# Patient Record
Sex: FEMALE | Race: WHITE | NOT HISPANIC OR LATINO | Employment: FULL TIME | URBAN - METROPOLITAN AREA
[De-identification: names, ages, dates, MRNs, and addresses within clinical notes are randomized per-mention and may not be internally consistent; named-entity substitution may affect disease eponyms.]

---

## 2020-06-16 ENCOUNTER — HISTORICAL (OUTPATIENT)
Dept: RADIOLOGY | Facility: HOSPITAL | Age: 45
End: 2020-06-16

## 2021-09-22 ENCOUNTER — HISTORICAL (OUTPATIENT)
Dept: RADIOLOGY | Facility: HOSPITAL | Age: 46
End: 2021-09-22

## 2022-10-25 DIAGNOSIS — Z12.31 ENCOUNTER FOR SCREENING MAMMOGRAM FOR MALIGNANT NEOPLASM OF BREAST: Primary | ICD-10-CM

## 2022-11-02 ENCOUNTER — HOSPITAL ENCOUNTER (OUTPATIENT)
Dept: RADIOLOGY | Facility: HOSPITAL | Age: 47
Discharge: HOME | End: 2022-11-02
Attending: OBSTETRICS & GYNECOLOGY
Payer: COMMERCIAL

## 2022-11-02 DIAGNOSIS — Z12.31 ENCOUNTER FOR SCREENING MAMMOGRAM FOR MALIGNANT NEOPLASM OF BREAST: ICD-10-CM

## 2022-11-02 PROCEDURE — 77067 SCR MAMMO BI INCL CAD: CPT | Mod: TC

## 2022-11-02 PROCEDURE — 77063 MAMMO DIGITAL SCREENING BILAT WITH TOMO: ICD-10-PCS | Mod: 26,,, | Performed by: RADIOLOGY

## 2022-11-02 PROCEDURE — 77067 SCR MAMMO BI INCL CAD: CPT | Mod: 26,,, | Performed by: RADIOLOGY

## 2022-11-02 PROCEDURE — 77067 MAMMO DIGITAL SCREENING BILAT WITH TOMO: ICD-10-PCS | Mod: 26,,, | Performed by: RADIOLOGY

## 2022-11-02 PROCEDURE — 77063 BREAST TOMOSYNTHESIS BI: CPT | Mod: 26,,, | Performed by: RADIOLOGY

## 2023-04-15 ENCOUNTER — HOSPITAL ENCOUNTER (EMERGENCY)
Facility: HOSPITAL | Age: 48
Discharge: HOME | End: 2023-04-15
Attending: EMERGENCY MEDICINE
Payer: COMMERCIAL

## 2023-04-15 VITALS
WEIGHT: 180 LBS | RESPIRATION RATE: 15 BRPM | HEIGHT: 67 IN | TEMPERATURE: 98 F | HEART RATE: 73 BPM | BODY MASS INDEX: 28.25 KG/M2 | DIASTOLIC BLOOD PRESSURE: 78 MMHG | OXYGEN SATURATION: 98 % | SYSTOLIC BLOOD PRESSURE: 116 MMHG

## 2023-04-15 DIAGNOSIS — H53.9 VISION CHANGES: ICD-10-CM

## 2023-04-15 DIAGNOSIS — R20.2 PARESTHESIAS: Primary | ICD-10-CM

## 2023-04-15 DIAGNOSIS — R07.9 CHEST PAIN: ICD-10-CM

## 2023-04-15 LAB
ALBUMIN SERPL-MCNC: 4.4 G/DL (ref 3.5–5)
ALBUMIN/GLOB SERPL: 1.5 RATIO (ref 1.1–2)
ALP SERPL-CCNC: 47 UNIT/L (ref 40–150)
ALT SERPL-CCNC: 9 UNIT/L (ref 0–55)
AST SERPL-CCNC: 12 UNIT/L (ref 5–34)
BASOPHILS # BLD AUTO: 0.03 X10(3)/MCL (ref 0–0.2)
BASOPHILS NFR BLD AUTO: 0.4 %
BILIRUBIN DIRECT+TOT PNL SERPL-MCNC: 0.6 MG/DL
BNP BLD-MCNC: 30.7 PG/ML
BUN SERPL-MCNC: 13 MG/DL (ref 7–18.7)
CALCIUM SERPL-MCNC: 9.9 MG/DL (ref 8.4–10.2)
CHLORIDE SERPL-SCNC: 106 MMOL/L (ref 98–107)
CO2 SERPL-SCNC: 28 MMOL/L (ref 22–29)
CREAT SERPL-MCNC: 0.79 MG/DL (ref 0.55–1.02)
EOSINOPHIL # BLD AUTO: 0.01 X10(3)/MCL (ref 0–0.9)
EOSINOPHIL NFR BLD AUTO: 0.1 %
ERYTHROCYTE [DISTWIDTH] IN BLOOD BY AUTOMATED COUNT: 11.9 % (ref 11.5–17)
GFR SERPLBLD CREATININE-BSD FMLA CKD-EPI: >60 MLS/MIN/1.73/M2
GLOBULIN SER-MCNC: 3 GM/DL (ref 2.4–3.5)
GLUCOSE SERPL-MCNC: 90 MG/DL (ref 74–100)
HCT VFR BLD AUTO: 40.9 % (ref 37–47)
HGB BLD-MCNC: 14.1 G/DL (ref 12–16)
IMM GRANULOCYTES # BLD AUTO: 0.02 X10(3)/MCL (ref 0–0.04)
IMM GRANULOCYTES NFR BLD AUTO: 0.3 %
INR BLD: 1.05 (ref 0–1.3)
LYMPHOCYTES # BLD AUTO: 1.53 X10(3)/MCL (ref 0.6–4.6)
LYMPHOCYTES NFR BLD AUTO: 22.7 %
MAGNESIUM SERPL-MCNC: 1.9 MG/DL (ref 1.6–2.6)
MCH RBC QN AUTO: 31.9 PG (ref 27–31)
MCHC RBC AUTO-ENTMCNC: 34.5 G/DL (ref 33–36)
MCV RBC AUTO: 92.5 FL (ref 80–94)
MONOCYTES # BLD AUTO: 0.7 X10(3)/MCL (ref 0.1–1.3)
MONOCYTES NFR BLD AUTO: 10.4 %
NEUTROPHILS # BLD AUTO: 4.46 X10(3)/MCL (ref 2.1–9.2)
NEUTROPHILS NFR BLD AUTO: 66.1 %
NRBC BLD AUTO-RTO: 0 %
PLATELET # BLD AUTO: 212 X10(3)/MCL (ref 130–400)
PMV BLD AUTO: 10.6 FL (ref 7.4–10.4)
POTASSIUM SERPL-SCNC: 3.8 MMOL/L (ref 3.5–5.1)
PROT SERPL-MCNC: 7.4 GM/DL (ref 6.4–8.3)
PROTHROMBIN TIME: 13.6 SECONDS (ref 12.5–14.5)
RBC # BLD AUTO: 4.42 X10(6)/MCL (ref 4.2–5.4)
SODIUM SERPL-SCNC: 141 MMOL/L (ref 136–145)
TROPONIN I SERPL-MCNC: <0.01 NG/ML (ref 0–0.04)
WBC # SPEC AUTO: 6.8 X10(3)/MCL (ref 4.5–11.5)

## 2023-04-15 PROCEDURE — 99285 EMERGENCY DEPT VISIT HI MDM: CPT | Mod: 25

## 2023-04-15 PROCEDURE — 85025 COMPLETE CBC W/AUTO DIFF WBC: CPT | Performed by: EMERGENCY MEDICINE

## 2023-04-15 PROCEDURE — 93005 ELECTROCARDIOGRAM TRACING: CPT

## 2023-04-15 PROCEDURE — 83880 ASSAY OF NATRIURETIC PEPTIDE: CPT | Performed by: EMERGENCY MEDICINE

## 2023-04-15 PROCEDURE — 93010 EKG 12-LEAD: ICD-10-PCS | Mod: ,,, | Performed by: INTERNAL MEDICINE

## 2023-04-15 PROCEDURE — 80053 COMPREHEN METABOLIC PANEL: CPT | Performed by: EMERGENCY MEDICINE

## 2023-04-15 PROCEDURE — 84484 ASSAY OF TROPONIN QUANT: CPT | Performed by: EMERGENCY MEDICINE

## 2023-04-15 PROCEDURE — 83735 ASSAY OF MAGNESIUM: CPT | Performed by: EMERGENCY MEDICINE

## 2023-04-15 PROCEDURE — 85610 PROTHROMBIN TIME: CPT | Performed by: EMERGENCY MEDICINE

## 2023-04-15 PROCEDURE — 93010 ELECTROCARDIOGRAM REPORT: CPT | Mod: ,,, | Performed by: INTERNAL MEDICINE

## 2023-04-15 NOTE — ED PROVIDER NOTES
Encounter Date: 4/15/2023    SCRIBE #1 NOTE: I, Fritz Durham, am scribing for, and in the presence of,  Douglas Markham MD. I have scribed the following portions of the note - Other sections scribed: HPI, ROS, PE.     History     Chief Complaint   Patient presents with    Numbness     Presents to er c/o intermittent numbness to body since Monday.   States today having chest pain, with left arm pain and blurry vision.  Pt is aaox4 ambulated into room with steady gait.  States seen at PCP this week for same symptoms.       48 y/o female presents to the ED with body numbness. Pt says she began feelingnumbness and pressure in her left arm on Monday. The numbness migrated to both of her feet. She saw her PCP on Tuesday, and blood work and an EKG were done that showed no abnormalities. The numbness began to migrate to different parts of her body, including her thighs, calves, feet, and hands. 2 days ago migrated to left shoulder blade. Yesterday she began experiencing intermittent blurry vision in her left eye, currently improved. Today, the numbness has migrated to her chest Denies any recent injuries and accidents. PCP Dr. Naomy Max.    The history is provided by the patient. No  was used.   Illness   The current episode started several days ago. The problem has been unchanged. Pertinent negatives include no fever, no abdominal pain, no nausea, no vomiting, no neck pain, no cough and no shortness of breath.   Review of patient's allergies indicates:  No Known Allergies  No past medical history on file.  No past surgical history on file.  No family history on file.     Review of Systems   Constitutional:  Negative for chills and fever.   Eyes:  Positive for visual disturbance.   Respiratory:  Negative for cough, chest tightness and shortness of breath.    Cardiovascular:  Negative for chest pain.   Gastrointestinal:  Negative for abdominal pain, nausea and vomiting.   Musculoskeletal:   Negative for myalgias and neck pain.   Neurological:  Positive for numbness. Negative for syncope.   All other systems reviewed and are negative.    Physical Exam     Initial Vitals [04/15/23 0813]   BP Pulse Resp Temp SpO2   (!) 152/73 92 16 97.9 °F (36.6 °C) 99 %      MAP       --         Physical Exam    Nursing note and vitals reviewed.  Constitutional: She appears well-developed and well-nourished. No distress.   HENT:   Head: Normocephalic and atraumatic.   Eyes: Conjunctivae are normal.   Visual fields intact   Cardiovascular:  Normal rate and intact distal pulses.           Pulmonary/Chest: No respiratory distress. She has no rhonchi.   Abdominal: Abdomen is soft. Bowel sounds are normal. There is no abdominal tenderness. There is no rebound and no guarding.   Musculoskeletal:         General: No edema.      Right upper arm: Normal.      Left upper arm: Normal.      Right forearm: Normal.      Left forearm: Normal.      Right upper leg: Normal.      Left upper leg: Normal.      Right lower leg: Normal.      Left lower leg: Normal.     Neurological: She is alert. She has normal strength.   Cranial nerves II-X intact    Nl strength deltoids, 5/5 f/e elbows, wrists, nl hand . Nl light touch sensation in radial, median and ulner distribution      Nl hip flexion 5/5 knee, ankle, great toes. Nl light touch sensation to distal LEs     Skin: Skin is warm and dry.   Psychiatric: She has a normal mood and affect.       ED Course   Procedures  Labs Reviewed   CBC WITH DIFFERENTIAL - Abnormal; Notable for the following components:       Result Value    MCH 31.9 (*)     MPV 10.6 (*)     All other components within normal limits   B-TYPE NATRIURETIC PEPTIDE - Normal   TROPONIN I - Normal   PROTIME-INR - Normal   MAGNESIUM - Normal   CBC W/ AUTO DIFFERENTIAL    Narrative:     The following orders were created for panel order CBC auto differential.  Procedure                               Abnormality         Status                      ---------                               -----------         ------                     CBC with Differential[[id number]]        Abnormal            Final result                 Please view results for these tests on the individual orders.   COMPREHENSIVE METABOLIC PANEL        ECG Results              EKG 12-lead (Preliminary result)  Result time 04/15/23 09:10:27      Wet Read by Douglas Markham MD (04/15/23 09:10:27, [facility] - Emergency Dept, Emergency Medicine)    8:18 a.m..  88 beats per minute.  Normal sinus rhythm.  No ST segment elevations or depressions.  Normal axis                                  Imaging Results              CT Head Without Contrast (Final result)  Result time 04/15/23 09:58:26      Final result by Jose Pedraza MD (04/15/23 09:58:26)                   Impression:      No acute abnormality.      Electronically signed by: Jose Pedraza MD  Date:    04/15/2023  Time:    09:58               Narrative:    EXAMINATION:  CT HEAD WITHOUT CONTRAST    CLINICAL HISTORY:  paresthesias;    TECHNIQUE:  Low dose axial CT images obtained throughout the head without intravenous contrast. Sagittal and coronal reconstructions were performed.  An automated dose exposure technique was utilized this limits radiation does the patient.    COMPARISON:  None.    FINDINGS:  Intracranial compartment:    Ventricles and sulci are normal in size for age without evidence of hydrocephalus. No extra-axial blood or fluid collections.    The brain parenchyma appears normal. No parenchymal mass, hemorrhage, edema or major vascular distribution infarct.    Skull/extracranial contents (limited evaluation): No fracture. Mastoid air cells and paranasal sinuses are essentially clear.                                       X-Ray Chest PA And Lateral (Final result)  Result time 04/15/23 09:04:14      Final result by Rayshawn Sherman MD (04/15/23 09:04:14)                   Impression:      No acute  cardiopulmonary process identified.      Electronically signed by: Rayshawn Sherman  Date:    04/15/2023  Time:    09:04               Narrative:    EXAMINATION:  XR CHEST PA AND LATERAL    CLINICAL HISTORY:  Chest Pain;    TECHNIQUE:  Two-view    COMPARISON:  None available.    FINDINGS:  Cardiopericardial silhouette is within normal limits. Lungs are without dense focal or segmental consolidation, congestion, pleural effusion or pneumothorax.                                       Medications - No data to display  Medical Decision Making:   Clinical Tests:   Lab Tests: Ordered    Medical Decision Making  The differential diagnosis includes, but is not limited to:    Amount and/or Complexity of Data Reviewed  Labs: ordered.  Radiology: ordered.  ECG/medicine tests: ordered.             Scribe Attestation:   Scribe #1: I performed the above scribed service and the documentation accurately describes the services I performed. I attest to the accuracy of the note.    Attending Attestation:           Physician Attestation for Scribe:  Physician Attestation Statement for Scribe #1: I, Douglas Markham MD, reviewed documentation, as scribed by Fritz Durham in my presence, and it is both accurate and complete.           ED Course as of 04/15/23 1024   Sat Apr 15, 2023   0933 Symptoms at 5 days ago.  On exam no focal neurologic deficits to suggest acute ischemic CVA.  Thus far electrolytes have been reassuring.  I have added a magnesium level which is pending.  I discussed differential with the patient and .  Currently her vision appears to be intact including confrontation all fields.  She is not having eye pain currently she states there may have been some mild pressure like feeling whenever it felt blurred.  Paresthesias initially in the left bicep region then became the whole left arm and bilateral feet and radiated up the right lower extremity from the feet toward the right thigh.  I explained that the migratory  nature of these paresthesias do not sound consistent with a CVA or spinal cord pathology but radicular neuropathy is a consideration.  On exam normal strength and sensation bilateral upper and lower extremities.  No indication for emergent MRI of the spine at this time.  Multiple sclerosis is also a consideration.  I would recommend she follow up with her PCP and neurologist as outpatient for further evaluation of this.  They expressed understanding [LF]      ED Course User Index  [LF] Douglas Markham MD                 Clinical Impression:   Final diagnoses:  [R07.9] Chest pain  [R20.2] Paresthesias (Primary)  [H53.9] Vision changes        ED Disposition Condition    Discharge Stable          ED Prescriptions    None       Follow-up Information       Follow up With Specialties Details Why Contact Info    Naomy Max MD Internal Medicine Schedule an appointment as soon as possible for a visit   [address]  [facility] [address]  [phone]      Jose Maria Fajardo MD Neurology Schedule an appointment as soon as possible for a visit   [address] [place] [address] Rd  [address]  [facility] [address]  [phone]               Douglas Markham MD  04/15/23 1024

## 2023-04-15 NOTE — DISCHARGE INSTRUCTIONS
Your lab work x-ray and CT scan are reassuring.  She continues to have episodes follow up with the primary care provider you may need additional testing such as an MRI.  You may need to see a neurologist for further evaluation

## 2023-04-25 DIAGNOSIS — R20.2 PARESTHESIA: Primary | ICD-10-CM

## 2023-06-13 ENCOUNTER — OFFICE VISIT (OUTPATIENT)
Dept: NEUROLOGY | Facility: CLINIC | Age: 48
End: 2023-06-13
Payer: COMMERCIAL

## 2023-06-13 VITALS
BODY MASS INDEX: 28.44 KG/M2 | SYSTOLIC BLOOD PRESSURE: 108 MMHG | HEART RATE: 78 BPM | DIASTOLIC BLOOD PRESSURE: 78 MMHG | WEIGHT: 181.19 LBS | HEIGHT: 67 IN

## 2023-06-13 DIAGNOSIS — H54.7 UNSPECIFIED VISUAL LOSS: Primary | ICD-10-CM

## 2023-06-13 DIAGNOSIS — R20.2 PARESTHESIA: ICD-10-CM

## 2023-06-13 PROCEDURE — 99204 OFFICE O/P NEW MOD 45 MIN: CPT | Mod: S$GLB,,, | Performed by: PSYCHIATRY & NEUROLOGY

## 2023-06-13 PROCEDURE — 3008F PR BODY MASS INDEX (BMI) DOCUMENTED: ICD-10-PCS | Mod: CPTII,S$GLB,, | Performed by: PSYCHIATRY & NEUROLOGY

## 2023-06-13 PROCEDURE — 99999 PR PBB SHADOW E&M-EST. PATIENT-LVL IV: CPT | Mod: PBBFAC,,, | Performed by: PSYCHIATRY & NEUROLOGY

## 2023-06-13 PROCEDURE — 1160F PR REVIEW ALL MEDS BY PRESCRIBER/CLIN PHARMACIST DOCUMENTED: ICD-10-PCS | Mod: CPTII,S$GLB,, | Performed by: PSYCHIATRY & NEUROLOGY

## 2023-06-13 PROCEDURE — 1159F MED LIST DOCD IN RCRD: CPT | Mod: CPTII,S$GLB,, | Performed by: PSYCHIATRY & NEUROLOGY

## 2023-06-13 PROCEDURE — 3078F PR MOST RECENT DIASTOLIC BLOOD PRESSURE < 80 MM HG: ICD-10-PCS | Mod: CPTII,S$GLB,, | Performed by: PSYCHIATRY & NEUROLOGY

## 2023-06-13 PROCEDURE — 3074F SYST BP LT 130 MM HG: CPT | Mod: CPTII,S$GLB,, | Performed by: PSYCHIATRY & NEUROLOGY

## 2023-06-13 PROCEDURE — 1159F PR MEDICATION LIST DOCUMENTED IN MEDICAL RECORD: ICD-10-PCS | Mod: CPTII,S$GLB,, | Performed by: PSYCHIATRY & NEUROLOGY

## 2023-06-13 PROCEDURE — 99999 PR PBB SHADOW E&M-EST. PATIENT-LVL IV: ICD-10-PCS | Mod: PBBFAC,,, | Performed by: PSYCHIATRY & NEUROLOGY

## 2023-06-13 PROCEDURE — 1160F RVW MEDS BY RX/DR IN RCRD: CPT | Mod: CPTII,S$GLB,, | Performed by: PSYCHIATRY & NEUROLOGY

## 2023-06-13 PROCEDURE — 3074F PR MOST RECENT SYSTOLIC BLOOD PRESSURE < 130 MM HG: ICD-10-PCS | Mod: CPTII,S$GLB,, | Performed by: PSYCHIATRY & NEUROLOGY

## 2023-06-13 PROCEDURE — 3078F DIAST BP <80 MM HG: CPT | Mod: CPTII,S$GLB,, | Performed by: PSYCHIATRY & NEUROLOGY

## 2023-06-13 PROCEDURE — 99204 PR OFFICE/OUTPT VISIT, NEW, LEVL IV, 45-59 MIN: ICD-10-PCS | Mod: S$GLB,,, | Performed by: PSYCHIATRY & NEUROLOGY

## 2023-06-13 PROCEDURE — 3008F BODY MASS INDEX DOCD: CPT | Mod: CPTII,S$GLB,, | Performed by: PSYCHIATRY & NEUROLOGY

## 2023-06-13 RX ORDER — GABAPENTIN 300 MG/1
CAPSULE ORAL
Qty: 90 CAPSULE | Refills: 11 | Status: SHIPPED | OUTPATIENT
Start: 2023-06-13 | End: 2023-07-24

## 2023-06-13 RX ORDER — ACETAMINOPHEN 500 MG
500 TABLET ORAL EVERY 6 HOURS PRN
COMMUNITY

## 2023-06-13 RX ORDER — MULTIVITAMIN
1 TABLET ORAL DAILY
COMMUNITY

## 2023-06-13 RX ORDER — POLYETHYLENE GLYCOL 3350 17 G/17G
34 POWDER, FOR SOLUTION ORAL
COMMUNITY

## 2023-06-13 NOTE — PROGRESS NOTES
Chief Complaint   Patient presents with    Numbness     NP: Referred by Dr. Naomy Max for Neuro consult to evaluate for Paresthesia: April 15 did go to [facility] ER for numbness and pain to left arms, both legs and later on numbness moved over to chest and  then started having blurred vision lasting for 48 hours. So far had a total of 3 episodes of numbness. A few days ago numbness started to right arm. Describes pain as dull.         This is a 47 y.o. female  here for numbness.  Referred by Dr. Naomy Max for Neuro consult to evaluate for Paresthesia:  Symptoms started several months ago in the left arm.  Patient noted numbness that starts in the shoulder and radiates down to the elbow.  She had been having numbness in her bilateral lower extremities, not necessarily the feet but more proximal for several months prior to that.  She attributed this to a herniated disc in her back, she underwent a microdiskectomy in October of 2022 with Dr. Smith.  Notably she is still having back pain that radiates into her legs, and can not walk long distances without getting back pain.  April 15 did go to [facility] ER because the numbness became painful and radiated into her chest.  Numbness radiates under her breasts and sometimes wraps around to her scapula.  f around the same time she was also having blurry vision in the left eye.  She saw her eye doctor and it was noted that in both eyes she needed new prescriptions in vision had become worse.  The pain associated with the numbness feels deep and at times like a shocking radiating down her arm.  Most recently she has noticed symptoms on the right arm as well.      Medication List with Changes/Refills   New Medications    GABAPENTIN (NEURONTIN) 300 MG CAPSULE    1 PO q pm for 3 days then increase to 1 PO BID for 3 days, can increase to 1 PO TID thereafter if needed   Current Medications    ACETAMINOPHEN (TYLENOL) 500 MG TABLET    Take 500 mg by mouth every 6 (six) hours as  needed.    MULTIVIT WITH MIN-FOLIC ACID 0.4 MG TAB    Take 1 tablet by mouth once daily.    POLYETHYLENE GLYCOL (GLYCOLAX) 17 GRAM PWPK    Take 34 g by mouth once daily.        Past Surgical History:   Procedure Laterality Date    CERVICAL DISCECTOMY          Past Medical History:   Diagnosis Date    Gastroparesis     Lumbar herniated disc         Family History   Problem Relation Age of Onset    Bipolar disorder Mother     Diabetes Father         Social History     Socioeconomic History    Marital status:    Tobacco Use    Smoking status: Never    Smokeless tobacco: Never   Substance and Sexual Activity    Alcohol use: Yes     Alcohol/week: 1.0 standard drink     Types: 1 Glasses of wine per week     Comment: three times weekly    Drug use: Never          Review of Systems  Review of Systems   Constitutional: Negative for appetite change.   HENT: Negative for sinus pressure and sore throat.    Eyes: Negative for visual disturbance.   Respiratory: Negative for cough and shortness of breath.    Cardiovascular: Negative for chest pain.   Gastrointestinal: Negative for diarrhea and nausea.   Endocrine: Negative for cold intolerance and heat intolerance.   Genitourinary: Negative for dysuria.   Musculoskeletal: Negative for arthralgias and myalgias.   Skin: Negative for rash.   Allergic/Immunologic: Negative for immunocompromised state.   Neurological:        See HPI   Hematological: Does not bruise/bleed easily.   Psychiatric/Behavioral: Negative for hallucinations.      General: alert and oriented, no acute distress, no audible wheezes, pulse intact, no edema    Vitals:    06/13/23 0809   BP: 108/78   Pulse: 78        Cognition and Comprehension  Speech and language intact  Follows commands  Speech fluent  Attention intact  Memory for recent events intact from history taking  Affect pleasant  Fund of knowledge adequate    Cranial nerves  II. Optic: Visual fields full to confrontation both eyes  III, IV, VI.  Oculomotor: Intact, Pupils equal, round and reactive to light, no nystagmus  V. Trigeminal: sensation to light touch normal  VII. No facial asymmetry or facial weakness  VIII. Hearing intact to spoken voice  IX/X. Glossopharyngeal/Vagus: Voice normal, palate rises symmetrically  XI. Axillary: Shoulder shrug normal  XII. Hypoglossal: Intact    Muscle Strength and Tone  Normal upper extremity tone  Normal lower extremity tone  Normal upper extremity strength  Normal lower extremity strength    Sensation  Intact to light touch and temperature    Reflexes  Normal and symmetric    Coordination and Gait  Finger to nose normal  Gait normal       Georgina was seen today for numbness.    Diagnoses and all orders for this visit:    Unspecified visual loss  -     MRI Brain W WO Contrast; Future  -     MRI Brain W WO Contrast    Paresthesia  -     Ambulatory referral/consult to Neurology  -     Sedimentation rate; Future  -     ANTI-SSA + ANTI-SSB; Future  -     TSH; Future  -     Vitamin B12; Future  -     Hemoglobin A1C; Future  -     Ferritin; Future  -     MAYI IgG by IFA; Future  -     Lyme Disease Ab, Immunoblot; Future  -     Pyridoxal 5-Phosphate (PLP); Future  -     MRI Thoracic Spine Demyelinating W W/O Contrast; Future  -     MRI Thoracic Spine Demyelinating W W/O Contrast  -     gabapentin (NEURONTIN) 300 MG capsule; 1 PO q pm for 3 days then increase to 1 PO BID for 3 days, can increase to 1 PO TID thereafter if needed       Neurologic exam is normal, we will obtain previous results from an MRI cervical spine that was done.  We will also order MRI brain and thoracic spine.  Can use gabapentin for nonspecific nerve pain.  Laboratory workup for neuropathy.  Discussed that we would call her with the results, and if everything is normal would consider doing an EMG.

## 2023-06-14 ENCOUNTER — LAB VISIT (OUTPATIENT)
Dept: LAB | Facility: HOSPITAL | Age: 48
End: 2023-06-14
Attending: PSYCHIATRY & NEUROLOGY
Payer: COMMERCIAL

## 2023-06-14 DIAGNOSIS — R20.2 PARESTHESIA: Primary | ICD-10-CM

## 2023-06-14 LAB
ERYTHROCYTE [SEDIMENTATION RATE] IN BLOOD: 7 MM/HR (ref 0–20)
EST. AVERAGE GLUCOSE BLD GHB EST-MCNC: 91.1 MG/DL
FERRITIN SERPL-MCNC: 36.54 NG/ML (ref 4.63–204)
HBA1C MFR BLD: 4.8 %
TSH SERPL-ACNC: 1.2 UIU/ML (ref 0.35–4.94)
VIT B12 SERPL-MCNC: 471 PG/ML (ref 213–816)

## 2023-06-14 PROCEDURE — 36415 COLL VENOUS BLD VENIPUNCTURE: CPT

## 2023-06-14 PROCEDURE — 86617 LYME DISEASE ANTIBODY: CPT

## 2023-06-14 PROCEDURE — 86235 NUCLEAR ANTIGEN ANTIBODY: CPT

## 2023-06-14 PROCEDURE — 86039 ANTINUCLEAR ANTIBODIES (ANA): CPT

## 2023-06-14 PROCEDURE — 82728 ASSAY OF FERRITIN: CPT

## 2023-06-14 PROCEDURE — 83036 HEMOGLOBIN GLYCOSYLATED A1C: CPT

## 2023-06-14 PROCEDURE — 85652 RBC SED RATE AUTOMATED: CPT

## 2023-06-14 PROCEDURE — 82607 VITAMIN B-12: CPT

## 2023-06-14 PROCEDURE — 84443 ASSAY THYROID STIM HORMONE: CPT

## 2023-06-14 PROCEDURE — 84207 ASSAY OF VITAMIN B-6: CPT

## 2023-06-15 LAB
ANA SER QL HEP2 SUBST: NORMAL
SSA(RO) AB QUANT (OHS): <0.4 U/ML
SSB(LA) AB QUANT (OHS): <0.4 U/ML

## 2023-06-16 LAB
B BURGDOR AB PATRN SER IB-IMP: NORMAL
B BURGDOR IGG PATRN SER IB-IMP: NORMAL KDA
B BURGDOR IGG SER QL IB: NEGATIVE
B BURGDOR IGM PATRN SER IB-IMP: NORMAL KDA
B BURGDOR IGM SER QL IB: NEGATIVE

## 2023-06-17 LAB — PYRIDOXAL PHOS SERPL-MCNC: 22 MCG/L (ref 5–50)

## 2023-06-19 NOTE — PROGRESS NOTES
B12 is a little low, I would supplement with B12 2000 micrograms daily.  Otherwise labs are overall normal.

## 2023-06-20 ENCOUNTER — TELEPHONE (OUTPATIENT)
Dept: NEUROLOGY | Facility: CLINIC | Age: 48
End: 2023-06-20

## 2023-06-20 NOTE — TELEPHONE ENCOUNTER
----- Message from Elle Padilla MD sent at 6/19/2023  4:24 PM CDT -----  B12 is a little low, I would supplement with B12 2000 micrograms daily.  Otherwise labs are overall normal.

## 2023-06-20 NOTE — TELEPHONE ENCOUNTER
Pt informed B12 is a little low would recommend OTC B12 supplement of 2000 mcg daily otherwise labs were overall normal

## 2023-06-26 NOTE — PROGRESS NOTES
MRI brain and MRI thoracic spine are overall unremarkable with no explanation for symptoms she is experiencing. Has she tried the medication (gbp) and did it help at all? I can send a referral for EMG if sx are still present.

## 2023-06-27 ENCOUNTER — TELEPHONE (OUTPATIENT)
Dept: NEUROLOGY | Facility: CLINIC | Age: 48
End: 2023-06-27
Payer: COMMERCIAL

## 2023-06-27 DIAGNOSIS — R20.2 PARESTHESIA: Primary | ICD-10-CM

## 2023-06-27 NOTE — TELEPHONE ENCOUNTER
Pt informed MRI brain and MRI thoracic spine are overall unremarkable with no explanation for symptoms. Pt states she started gbp is slightly helpful regarding her arms and legs, but states chest pain and tightness has become pretty severe along with increase blurred vision that is now at least once a day can be up to twice a day and some difficulty with heat intolerance states she becomes very light headed easily

## 2023-06-27 NOTE — TELEPHONE ENCOUNTER
Has she had an echocardiogram which is an ultrasound of heart? I cannot recall if she said she saw a cardiologist. I can go ahead and order EMG of [facility]

## 2023-06-27 NOTE — TELEPHONE ENCOUNTER
----- Message from Elle Padilla MD sent at 6/26/2023  4:29 PM CDT -----  MRI brain and MRI thoracic spine are overall unremarkable with no explanation for symptoms she is experiencing. Has she tried the medication (gbp) and did it help at all? I can send a referral for EMG if sx are still present.

## 2023-06-28 NOTE — TELEPHONE ENCOUNTER
Pt denies echocardiogram or having a cardiologist.    Informed of EMG order of DANIKAE states was contacted to schedule with Dr. Huntley is scheduled three months out is requesting if referral can be sent else where

## 2023-06-29 NOTE — TELEPHONE ENCOUNTER
Try faxing referral to Dr. Canseco to see if he has sooner availability.  Unfortunately, her appt with Dr. Hnutley will likely be soonest available

## 2023-06-29 NOTE — TELEPHONE ENCOUNTER
I would recommend she reach out to her PCP regarding a cardiac workup/referral.  Usually, before making referral to Cardiology, PCP's will do a preliminary workup from a cardiac perspective and then refer to cardiology

## 2023-06-29 NOTE — TELEPHONE ENCOUNTER
Pt informed referral faxed to Dr. Canseco     Pt requesting possible referral to [facility] for cardio and recommendation on echocardiogram

## 2023-07-18 ENCOUNTER — TELEPHONE (OUTPATIENT)
Dept: NEUROLOGY | Facility: CLINIC | Age: 48
End: 2023-07-18
Payer: COMMERCIAL

## 2023-07-19 ENCOUNTER — TELEPHONE (OUTPATIENT)
Dept: NEUROLOGY | Facility: CLINIC | Age: 48
End: 2023-07-19
Payer: COMMERCIAL

## 2023-07-19 DIAGNOSIS — R20.2 PARESTHESIA: Primary | ICD-10-CM

## 2023-07-19 DIAGNOSIS — G43.909 MIGRAINE WITHOUT STATUS MIGRAINOSUS, NOT INTRACTABLE, UNSPECIFIED MIGRAINE TYPE: Primary | ICD-10-CM

## 2023-07-19 NOTE — TELEPHONE ENCOUNTER
Pt informed EMG was normal Pt stated she did have ED visit July 4th due to intense chest pain that radiated up her neck stated ED confirmed it was not cardio related currently symptoms are burning sensation mostly to left side has appointment with cardiologist  May 7/20/2023 informed Pt to request office visit notes and any request workup to be faxed to Dr. Padilla's office

## 2023-07-19 NOTE — PROGRESS NOTES
Received EMG from  office. He may have told her result there but it was normal. How are her sx and was she ever able to get referral to cardiologist

## 2023-07-19 NOTE — TELEPHONE ENCOUNTER
----- Message from Elle Padilla MD sent at 7/19/2023  1:37 PM CDT -----  Received EMG from  office. He may have told her result there but it was normal. How are her sx and was she ever able to get referral to cardiologist

## 2023-07-24 RX ORDER — GABAPENTIN 600 MG/1
600 TABLET ORAL 3 TIMES DAILY
Qty: 90 TABLET | Refills: 5 | Status: SHIPPED | OUTPATIENT
Start: 2023-07-24 | End: 2023-11-13

## 2023-07-24 RX ORDER — BACLOFEN 5 MG/1
5 TABLET ORAL 3 TIMES DAILY PRN
Qty: 30 TABLET | Refills: 0 | Status: SHIPPED | OUTPATIENT
Start: 2023-07-24 | End: 2023-07-31

## 2023-07-24 NOTE — TELEPHONE ENCOUNTER
Pt informed increase of gbp 600 mg TID could cause drowsiness. Intermittent facial pain baclofen 5 mg TID PRN     Rx sent to pharmacy

## 2023-07-24 NOTE — TELEPHONE ENCOUNTER
Pt requesting Rx to possibly help aid in left head pain states gbp is not helpful in decreasing pain states leaving for trip tomorrow morning for two weeks, please advise.    Pt states has follow up to be evaluated and possibly release from cardio august 15 please contact Pt to set up follow up appointment after this date

## 2023-07-24 NOTE — TELEPHONE ENCOUNTER
Increase gabapentin 600 mg three times daily. Just caution her that this could cause some drowsiness especially since she is going on a work trip.    Given the facial pain is intermittent in nature, we can also give her baclofen 5 mg TID PRN, facial pain.    Please send Rx for her

## 2023-07-24 NOTE — TELEPHONE ENCOUNTER
Patient reports worsening symptoms starting Friday.   Complaints of nerve like pain to the left back side of her skull traveling to her temple, facial pain especially around her eyes and chest pain. States pain is located to left side only. States symptoms are intermittent however the past 3 days around 6pm the pain will come on until she's able to fall asleep.    Admits to taking Gabapentin which is helpful with pain located to her arm and leg however does not seem to help with new symptoms.     States she'll be going out of town for business trip tomorrow.

## 2023-07-28 ENCOUNTER — TELEPHONE (OUTPATIENT)
Dept: NEUROLOGY | Facility: CLINIC | Age: 48
End: 2023-07-28
Payer: COMMERCIAL

## 2023-07-28 NOTE — TELEPHONE ENCOUNTER
Wants to know if her prescription for Baclofen can be increase. She picked up her prescription on the 26 th and is taking it 3 times a day. If not want to know if there is anything that can be ordered for her nerve pain. She is going on vacation for 8 days and is afraid she will run out of the prescription. Please advise.

## 2023-07-28 NOTE — TELEPHONE ENCOUNTER
Wants to know if her amount of Baclofen can be increase, states she is taking it 3 times a day and she will run out. She is leaving for vacation. Please advise.

## 2023-07-31 DIAGNOSIS — G43.909 MIGRAINE WITHOUT STATUS MIGRAINOSUS, NOT INTRACTABLE, UNSPECIFIED MIGRAINE TYPE: ICD-10-CM

## 2023-07-31 RX ORDER — BACLOFEN 10 MG/1
10 TABLET ORAL 3 TIMES DAILY
Qty: 90 TABLET | Refills: 5 | Status: SHIPPED | OUTPATIENT
Start: 2023-07-31 | End: 2023-08-01 | Stop reason: SDUPTHER

## 2023-07-31 NOTE — TELEPHONE ENCOUNTER
No, because she mentioned to me she was taking baclofen 10 mg. She did check her bottle and she is taking 5 mg. States she is on vacation now, she will double her dose for now and when she gets closer to a town she will call back and let us know where to call the prescription in

## 2023-07-31 NOTE — TELEPHONE ENCOUNTER
In the previous message, I had said she can try increasing to 10 mg TID.  Did that rx get sent in?

## 2023-08-01 RX ORDER — BACLOFEN 10 MG/1
10 TABLET ORAL 3 TIMES DAILY
Qty: 90 TABLET | Refills: 0 | Status: SHIPPED | OUTPATIENT
Start: 2023-08-01 | End: 2023-08-30 | Stop reason: SDUPTHER

## 2023-08-10 ENCOUNTER — OFFICE VISIT (OUTPATIENT)
Dept: NEUROLOGY | Facility: CLINIC | Age: 48
End: 2023-08-10
Payer: COMMERCIAL

## 2023-08-10 VITALS
SYSTOLIC BLOOD PRESSURE: 112 MMHG | WEIGHT: 188 LBS | BODY MASS INDEX: 29.51 KG/M2 | HEIGHT: 67 IN | DIASTOLIC BLOOD PRESSURE: 86 MMHG | HEART RATE: 80 BPM

## 2023-08-10 DIAGNOSIS — M54.81 OCCIPITAL NEURALGIA OF LEFT SIDE: Primary | ICD-10-CM

## 2023-08-10 DIAGNOSIS — R20.2 PARESTHESIAS: ICD-10-CM

## 2023-08-10 PROCEDURE — 99214 PR OFFICE/OUTPT VISIT, EST, LEVL IV, 30-39 MIN: ICD-10-PCS | Mod: 25,S$GLB,, | Performed by: NURSE PRACTITIONER

## 2023-08-10 PROCEDURE — 1159F PR MEDICATION LIST DOCUMENTED IN MEDICAL RECORD: ICD-10-PCS | Mod: CPTII,S$GLB,, | Performed by: NURSE PRACTITIONER

## 2023-08-10 PROCEDURE — 3079F PR MOST RECENT DIASTOLIC BLOOD PRESSURE 80-89 MM HG: ICD-10-PCS | Mod: CPTII,S$GLB,, | Performed by: NURSE PRACTITIONER

## 2023-08-10 PROCEDURE — 3008F PR BODY MASS INDEX (BMI) DOCUMENTED: ICD-10-PCS | Mod: CPTII,S$GLB,, | Performed by: NURSE PRACTITIONER

## 2023-08-10 PROCEDURE — 3044F PR MOST RECENT HEMOGLOBIN A1C LEVEL <7.0%: ICD-10-PCS | Mod: CPTII,S$GLB,, | Performed by: NURSE PRACTITIONER

## 2023-08-10 PROCEDURE — 1160F RVW MEDS BY RX/DR IN RCRD: CPT | Mod: CPTII,S$GLB,, | Performed by: NURSE PRACTITIONER

## 2023-08-10 PROCEDURE — 1159F MED LIST DOCD IN RCRD: CPT | Mod: CPTII,S$GLB,, | Performed by: NURSE PRACTITIONER

## 2023-08-10 PROCEDURE — 3008F BODY MASS INDEX DOCD: CPT | Mod: CPTII,S$GLB,, | Performed by: NURSE PRACTITIONER

## 2023-08-10 PROCEDURE — 1160F PR REVIEW ALL MEDS BY PRESCRIBER/CLIN PHARMACIST DOCUMENTED: ICD-10-PCS | Mod: CPTII,S$GLB,, | Performed by: NURSE PRACTITIONER

## 2023-08-10 PROCEDURE — 3044F HG A1C LEVEL LT 7.0%: CPT | Mod: CPTII,S$GLB,, | Performed by: NURSE PRACTITIONER

## 2023-08-10 PROCEDURE — 99999 PR PBB SHADOW E&M-EST. PATIENT-LVL III: CPT | Mod: PBBFAC,,, | Performed by: NURSE PRACTITIONER

## 2023-08-10 PROCEDURE — 64405 NJX AA&/STRD GR OCPL NRV: CPT | Mod: LT,S$GLB,, | Performed by: NURSE PRACTITIONER

## 2023-08-10 PROCEDURE — 99999 PR PBB SHADOW E&M-EST. PATIENT-LVL III: ICD-10-PCS | Mod: PBBFAC,,, | Performed by: NURSE PRACTITIONER

## 2023-08-10 PROCEDURE — 3074F SYST BP LT 130 MM HG: CPT | Mod: CPTII,S$GLB,, | Performed by: NURSE PRACTITIONER

## 2023-08-10 PROCEDURE — 3074F PR MOST RECENT SYSTOLIC BLOOD PRESSURE < 130 MM HG: ICD-10-PCS | Mod: CPTII,S$GLB,, | Performed by: NURSE PRACTITIONER

## 2023-08-10 PROCEDURE — 99214 OFFICE O/P EST MOD 30 MIN: CPT | Mod: 25,S$GLB,, | Performed by: NURSE PRACTITIONER

## 2023-08-10 PROCEDURE — 3079F DIAST BP 80-89 MM HG: CPT | Mod: CPTII,S$GLB,, | Performed by: NURSE PRACTITIONER

## 2023-08-10 PROCEDURE — 64405 PR NERVE BLOCK INJ, ANES/STEROID, OCCIPITAL: ICD-10-PCS | Mod: LT,S$GLB,, | Performed by: NURSE PRACTITIONER

## 2023-08-10 RX ORDER — LANOLIN ALCOHOL/MO/W.PET/CERES
100 CREAM (GRAM) TOPICAL DAILY
COMMUNITY

## 2023-08-10 RX ORDER — ASPIRIN 81 MG/1
81 TABLET ORAL DAILY
COMMUNITY

## 2023-08-10 RX ORDER — BUPIVACAINE HYDROCHLORIDE 5 MG/ML
2 INJECTION, SOLUTION EPIDURAL; INTRACAUDAL
Status: SHIPPED | OUTPATIENT
Start: 2023-08-10

## 2023-08-10 NOTE — ASSESSMENT & PLAN NOTE
ONB procedure note  Area cleaned 2 cm to the left of inion injection to HOSSEIN with 30 grade 1/2 inch needle. Marcaine 2 cc in the left sub-dermis. No side effects immediately following and no blood loss. Universal precautions used.    -continue baclofen 10 mg t.i.d..  If occipital nerve block helpful, can try lowering dose

## 2023-08-10 NOTE — PROGRESS NOTES
Subjective:       Patient ID: Georgina Roman is a 48 y.o. female.    Chief Complaint: Unspecified visual loss (Vision is still blurry. Says the wrong word sometimes. Baclofen helps a little with nerve pain to back of head. Gabapentin helps with arm, chest, and leg pain. )      History of Present Illness:  Follow-up visit for numbness and paresthesias.  She called for a sooner than regularly scheduled appointment to discuss the pain she is been experiencing in the left occipital region.  It starts at the base of her neck and shoots up into the occipital region.  She says the pain is there constantly and feels like a nerve related pain.  We initially increase her gabapentin dose to 600 mg t.i.d..  The gabapentin significantly helped with the paresthesias in her arm and legs, but did not help the pain in the back of her head at all.  At that point, baclofen was added for possible occipital neuralgia.  She is currently taking 10 mg t.i.d. and has noticed significant improvement in the pain, but is having side effects with the gabapentin and baclofen.  She is having difficulty with word finding and brain fog which is cumbersome in regards to her occupation.  The pain is still present in the left occipital region, but not nearly as severe.      Review of Systems  I have reviewed a 12 point review of systems which is negative unless otherwise stated in HPI        Past Medical History:   Diagnosis Date    Gastroparesis     Lumbar herniated disc        Past Surgical History:   Procedure Laterality Date    CERVICAL DISCECTOMY          Family History   Problem Relation Age of Onset    Bipolar disorder Mother     Diabetes Father         Social History     Socioeconomic History    Marital status:    Tobacco Use    Smoking status: Never    Smokeless tobacco: Never   Substance and Sexual Activity    Alcohol use: Yes     Alcohol/week: 1.0 standard drink of alcohol     Types: 1 Glasses of wine per week     Comment:  "three times weekly    Drug use: Never        Outpatient Encounter Medications as of 8/10/2023   Medication Sig Dispense Refill    acetaminophen (TYLENOL) 500 MG tablet Take 500 mg by mouth every 6 (six) hours as needed.      aspirin (ECOTRIN) 81 MG EC tablet Take 81 mg by mouth once daily.      baclofen (LIORESAL) 10 MG tablet Take 1 tablet (10 mg total) by mouth 3 (three) times daily. 90 tablet 0    cyanocobalamin (VITAMIN B-12) 1000 MCG tablet Take 100 mcg by mouth once daily.      gabapentin (NEURONTIN) 600 MG tablet Take 1 tablet (600 mg total) by mouth 3 (three) times daily. 90 tablet 5    multivit with min-folic acid 0.4 mg Tab Take 1 tablet by mouth once daily.      polyethylene glycol (GLYCOLAX) 17 gram PwPk Take 34 g by mouth as needed.      [DISCONTINUED] baclofen (LIORESAL) 10 MG tablet Take 1 tablet (10 mg total) by mouth 3 (three) times daily. 90 tablet 5    [DISCONTINUED] baclofen (LIORESAL) 5 mg Tab tablet Take 1 tablet (5 mg total) by mouth 3 (three) times daily as needed (facial pain). 30 tablet 0     Facility-Administered Encounter Medications as of 8/10/2023   Medication Dose Route Frequency Provider Last Rate Last Admin    BUPivacaine (PF) 0.5% (5 mg/mL) injection 10 mg  2 mL Subcutaneous 1 time in Clinic/HOD Amarilis Faye FNP          Objective:   /86 (BP Location: Left arm)   Pulse 80   Ht 5' 7" (1.702 m)   Wt 85.3 kg (188 lb)   BMI 29.44 kg/m²        Physical Exam  NAD  alert and oriented  cognition and perception intact  no aphasia  EOMI  no facial asymmetry  no dysarthria  moves all extremities symmetrically  no gross coordination abnormalities  gait normal       Assessment & Plan:      1. Occipital neuralgia of left side  Assessment & Plan:  ONB procedure note  Area cleaned 2 cm to the left of inion injection to HOSSEIN with 30 grade 1/2 inch needle. Marcaine 2 cc in the left sub-dermis. No side effects immediately following and no blood loss. Universal precautions " used.    -continue baclofen 10 mg t.i.d..  If occipital nerve block helpful, can try lowering dose    Orders:  -     BUPivacaine (PF) 0.5% (5 mg/mL) injection 10 mg    2. Paresthesias  Overview:  Started in early 2023.  Paresthesias that have occurred in the bilateral lower extremities, left arm, head, chest.  Initially felt to be due to a herniated disc in her back so she underwent a microdiskectomy in October of 2022 with Dr. Smith.  Paresthesias persisted despite surgery.  She underwent MRI brain and thoracic spine which were overall unremarkable.  She also underwent EMG which was normal.  She was started on gabapentin for nonspecific paresthesias.    Assessment & Plan:  -much better with gabapentin, but higher dosing is likely contributing to side effects.  Can try going back down to 300 mg t.i.d. since the increase to 600 mg t.i.d. did not provide any benefit with the occipital neuralgia type pain        Prior to the patient's arrival on the same day, I spent 10 minutes reviewing chart. Once in the exam room with the patient, I spent 25 minutes in the room with the member performing a history and exam as well as reviewing the test results and recommendations with the patient. After leaving the exam room, I spend an additional 5 minutes completing the electronic health record. The total time spent  that day caring for the member is 40 minutes, and this time--including the breakdown--is documented in the medical record.     This note was created with the assistance of voice recognition software. There may be transcription errors as a result of using this technology however minimal. Effort has been made to assure accuracy of transcription but any obvious errors or omissions should be clarified with the author of the document.

## 2023-08-10 NOTE — ASSESSMENT & PLAN NOTE
-much better with gabapentin, but higher dosing is likely contributing to side effects.  Can try going back down to 300 mg t.i.d. since the increase to 600 mg t.i.d. did not provide any benefit with the occipital neuralgia type pain

## 2023-08-30 DIAGNOSIS — G43.909 MIGRAINE WITHOUT STATUS MIGRAINOSUS, NOT INTRACTABLE, UNSPECIFIED MIGRAINE TYPE: ICD-10-CM

## 2023-08-31 RX ORDER — BACLOFEN 10 MG/1
10 TABLET ORAL 3 TIMES DAILY
Qty: 90 TABLET | Refills: 0 | Status: SHIPPED | OUTPATIENT
Start: 2023-08-31 | End: 2023-11-13

## 2023-11-13 ENCOUNTER — OFFICE VISIT (OUTPATIENT)
Dept: NEUROLOGY | Facility: CLINIC | Age: 48
End: 2023-11-13
Payer: COMMERCIAL

## 2023-11-13 VITALS
DIASTOLIC BLOOD PRESSURE: 76 MMHG | BODY MASS INDEX: 27.62 KG/M2 | SYSTOLIC BLOOD PRESSURE: 104 MMHG | WEIGHT: 176 LBS | HEIGHT: 67 IN | HEART RATE: 72 BPM

## 2023-11-13 DIAGNOSIS — R20.2 PARESTHESIAS: Primary | ICD-10-CM

## 2023-11-13 DIAGNOSIS — M54.81 OCCIPITAL NEURALGIA OF LEFT SIDE: ICD-10-CM

## 2023-11-13 PROCEDURE — 1160F PR REVIEW ALL MEDS BY PRESCRIBER/CLIN PHARMACIST DOCUMENTED: ICD-10-PCS | Mod: CPTII,S$GLB,, | Performed by: NURSE PRACTITIONER

## 2023-11-13 PROCEDURE — 3008F PR BODY MASS INDEX (BMI) DOCUMENTED: ICD-10-PCS | Mod: CPTII,S$GLB,, | Performed by: NURSE PRACTITIONER

## 2023-11-13 PROCEDURE — 3044F HG A1C LEVEL LT 7.0%: CPT | Mod: CPTII,S$GLB,, | Performed by: NURSE PRACTITIONER

## 2023-11-13 PROCEDURE — 3008F BODY MASS INDEX DOCD: CPT | Mod: CPTII,S$GLB,, | Performed by: NURSE PRACTITIONER

## 2023-11-13 PROCEDURE — 1159F PR MEDICATION LIST DOCUMENTED IN MEDICAL RECORD: ICD-10-PCS | Mod: CPTII,S$GLB,, | Performed by: NURSE PRACTITIONER

## 2023-11-13 PROCEDURE — 99999 PR PBB SHADOW E&M-EST. PATIENT-LVL III: CPT | Mod: PBBFAC,,, | Performed by: NURSE PRACTITIONER

## 2023-11-13 PROCEDURE — 3078F DIAST BP <80 MM HG: CPT | Mod: CPTII,S$GLB,, | Performed by: NURSE PRACTITIONER

## 2023-11-13 PROCEDURE — 1159F MED LIST DOCD IN RCRD: CPT | Mod: CPTII,S$GLB,, | Performed by: NURSE PRACTITIONER

## 2023-11-13 PROCEDURE — 1160F RVW MEDS BY RX/DR IN RCRD: CPT | Mod: CPTII,S$GLB,, | Performed by: NURSE PRACTITIONER

## 2023-11-13 PROCEDURE — 99213 OFFICE O/P EST LOW 20 MIN: CPT | Mod: S$GLB,,, | Performed by: NURSE PRACTITIONER

## 2023-11-13 PROCEDURE — 3078F PR MOST RECENT DIASTOLIC BLOOD PRESSURE < 80 MM HG: ICD-10-PCS | Mod: CPTII,S$GLB,, | Performed by: NURSE PRACTITIONER

## 2023-11-13 PROCEDURE — 99213 PR OFFICE/OUTPT VISIT, EST, LEVL III, 20-29 MIN: ICD-10-PCS | Mod: S$GLB,,, | Performed by: NURSE PRACTITIONER

## 2023-11-13 PROCEDURE — 99999 PR PBB SHADOW E&M-EST. PATIENT-LVL III: ICD-10-PCS | Mod: PBBFAC,,, | Performed by: NURSE PRACTITIONER

## 2023-11-13 PROCEDURE — 3074F SYST BP LT 130 MM HG: CPT | Mod: CPTII,S$GLB,, | Performed by: NURSE PRACTITIONER

## 2023-11-13 PROCEDURE — 3044F PR MOST RECENT HEMOGLOBIN A1C LEVEL <7.0%: ICD-10-PCS | Mod: CPTII,S$GLB,, | Performed by: NURSE PRACTITIONER

## 2023-11-13 PROCEDURE — 3074F PR MOST RECENT SYSTOLIC BLOOD PRESSURE < 130 MM HG: ICD-10-PCS | Mod: CPTII,S$GLB,, | Performed by: NURSE PRACTITIONER

## 2023-11-13 NOTE — PROGRESS NOTES
Subjective:       Patient ID: Georgina Roman is a 48 y.o. female.    Chief Complaint: Occipital neuralgia of left side (Numbness to left side of face went away. At times has sporadic chest pain. Did cardiac workup and it was negative.  ) and Numbness (Paresthesia has went away. Every now an then may have a few minutes of numbness to bilat. Lower extremities, left arm, head and chest.)      History of Present Illness:  Follow-up visit for numbness and paresthesias.  On her last visit in August, she was given an occipital nerve block for severe left occipital nerve pain.  About 2 weeks after the occipital nerve block, her head pain essentially resolved.  She also noticed significant improvement in frequency and severity of her paresthesias in her arm and legs.  Given the improvement in symptoms, she was able to stop gabapentin and baclofen.  She occasionally will feel the paresthesias in her arms and legs, but it is very infrequent and mild.  She will occasionally have bilateral chest pain that more often occurs on the left and feels like it radiates into her armpits.  When discussing symptoms, she says that compression of the nerves in that area is a good way to describe it.  She had a full cardiac workup that was reportedly negative.  She says this pain only presents about once a week and lasts for about an hour and then resolves.  She has noticed it happening when she works out, but she is also noticed it when lying down in bed at night.        Review of Systems  I have reviewed a 12 point review of systems which is negative unless otherwise stated in HPI        Past Medical History:   Diagnosis Date    Gastroparesis     Lumbar herniated disc        Past Surgical History:   Procedure Laterality Date    CERVICAL DISCECTOMY          Family History   Problem Relation Age of Onset    Bipolar disorder Mother     Diabetes Father         Social History     Socioeconomic History    Marital status:    Tobacco Use  "   Smoking status: Never    Smokeless tobacco: Never   Substance and Sexual Activity    Alcohol use: Yes     Alcohol/week: 1.0 standard drink of alcohol     Types: 1 Glasses of wine per week     Comment: three times weekly    Drug use: Never        Outpatient Encounter Medications as of 11/13/2023   Medication Sig Dispense Refill    acetaminophen (TYLENOL) 500 MG tablet Take 500 mg by mouth every 6 (six) hours as needed.      cyanocobalamin (VITAMIN B-12) 1000 MCG tablet Take 100 mcg by mouth once daily.      multivit with min-folic acid 0.4 mg Tab Take 1 tablet by mouth once daily.      polyethylene glycol (GLYCOLAX) 17 gram PwPk Take 34 g by mouth as needed.      aspirin (ECOTRIN) 81 MG EC tablet Take 81 mg by mouth once daily.      [DISCONTINUED] baclofen (LIORESAL) 10 MG tablet Take 1 tablet (10 mg total) by mouth 3 (three) times daily. (Patient not taking: Reported on 11/13/2023) 90 tablet 0    [DISCONTINUED] gabapentin (NEURONTIN) 600 MG tablet Take 1 tablet (600 mg total) by mouth 3 (three) times daily. (Patient not taking: Reported on 11/13/2023) 90 tablet 5     Facility-Administered Encounter Medications as of 11/13/2023   Medication Dose Route Frequency Provider Last Rate Last Admin    BUPivacaine (PF) 0.5% (5 mg/mL) injection 10 mg  2 mL Subcutaneous 1 time in Clinic/[facility] Amarilis Faye FNP          Objective:   /76 (BP Location: Left arm)   Pulse 72   Ht 5' 7" (1.702 m)   Wt 79.8 kg (176 lb)   BMI 27.57 kg/m²        Physical Exam  NAD  alert and oriented  cognition and perception intact  no aphasia  EOMI  no facial asymmetry  no dysarthria  moves all extremities symmetrically  no gross coordination abnormalities  gait normal       Assessment & Plan:      1. Paresthesias  Overview:  Started in early 2023.  Paresthesias that have occurred in the bilateral lower extremities, left arm, head, chest.  Initially felt to be due to a herniated disc in her back so she underwent a microdiskectomy in " October of 2022 with Dr. Smith.  Paresthesias persisted despite surgery.  She underwent MRI brain and thoracic spine which were overall unremarkable.  She also underwent EMG which was normal.  She was started on gabapentin for nonspecific paresthesias.    Assessment & Plan:  -Only occurring very occasionally now.  Continue off gabapentin.  If symptoms return in the future, would try either Cymbalta or TCA given her problematic side effects with gabapentin      2. Occipital neuralgia of left side  Assessment & Plan:  -resolved.  Can repeat occipital nerve block in the future if needed            This note was created with the assistance of voice recognition software. There may be transcription errors as a result of using this technology however minimal. Effort has been made to assure accuracy of transcription but any obvious errors or omissions should be clarified with the author of the document.

## 2023-11-13 NOTE — ASSESSMENT & PLAN NOTE
-Only occurring very occasionally now.  Continue off gabapentin.  If symptoms return in the future, would try either Cymbalta or TCA given her problematic side effects with gabapentin

## 2024-08-06 DIAGNOSIS — Z12.31 OTHER SCREENING MAMMOGRAM: Primary | ICD-10-CM

## 2024-08-13 ENCOUNTER — HOSPITAL ENCOUNTER (OUTPATIENT)
Dept: RADIOLOGY | Facility: HOSPITAL | Age: 49
Discharge: HOME | End: 2024-08-13
Attending: OBSTETRICS & GYNECOLOGY
Payer: COMMERCIAL

## 2024-08-13 DIAGNOSIS — Z12.31 OTHER SCREENING MAMMOGRAM: ICD-10-CM

## 2024-08-13 PROCEDURE — 77067 SCR MAMMO BI INCL CAD: CPT | Mod: 26,,, | Performed by: RADIOLOGY

## 2024-08-13 PROCEDURE — 77063 BREAST TOMOSYNTHESIS BI: CPT | Mod: 26,,, | Performed by: RADIOLOGY

## 2024-08-13 PROCEDURE — 77067 SCR MAMMO BI INCL CAD: CPT | Mod: TC
